# Patient Record
Sex: MALE | Race: OTHER | NOT HISPANIC OR LATINO | URBAN - METROPOLITAN AREA
[De-identification: names, ages, dates, MRNs, and addresses within clinical notes are randomized per-mention and may not be internally consistent; named-entity substitution may affect disease eponyms.]

---

## 2021-04-02 ENCOUNTER — OFFICE (OUTPATIENT)
Dept: URBAN - METROPOLITAN AREA CLINIC 34 | Facility: CLINIC | Age: 56
End: 2021-04-02

## 2021-04-02 VITALS
HEIGHT: 71 IN | DIASTOLIC BLOOD PRESSURE: 93 MMHG | HEART RATE: 70 BPM | TEMPERATURE: 96.8 F | WEIGHT: 177.8 LBS | SYSTOLIC BLOOD PRESSURE: 135 MMHG

## 2021-04-02 DIAGNOSIS — K56.609 UNSPECIFIED INTESTINAL OBSTRUCTION, UNSPECIFIED AS TO PARTIA: ICD-10-CM

## 2021-04-02 PROCEDURE — 99243 OFF/OP CNSLTJ NEW/EST LOW 30: CPT | Performed by: PHYSICIAN ASSISTANT

## 2021-04-02 NOTE — SERVICEHPINOTES
DAVE MANRIQUEZ   is a   55   year old male who is being seen in consultation at the request of   CHANTAL BURNHAM   for recent small bowel obstruction. He has h/o initial SBO for which he had surgery and bowel resection in 2006 and was told he had scar tissue at that time, despite no prior surgeries. He reports an SBO in 2017 and again had recent ER visit for diffuse abdominal pain and CT showing SBO in mid-distal region. Symptoms resolved with conservative care. He notes remote h/o tapeworm or some type of parasite when he was young. He is from [place]. He normally has 2-3 BMs per day, no blood and no diarrhea. He had a colonoscopy circa 2008 and is aware he is due for a screening procedure- wants to wait until this summer. He is feeling well today. He takes Enbrel for ankylosing spondylitis.

## 2022-12-22 PROBLEM — Z12.11 SCREENING COLON: Status: ACTIVE | Noted: 2022-12-22

## 2023-01-16 ENCOUNTER — OFFICE (OUTPATIENT)
Dept: URBAN - METROPOLITAN AREA CLINIC 34 | Facility: CLINIC | Age: 58
End: 2023-01-16

## 2023-01-16 PROCEDURE — 00031: CPT | Performed by: INTERNAL MEDICINE

## 2023-05-23 ENCOUNTER — OFFICE (OUTPATIENT)
Dept: URBAN - METROPOLITAN AREA CLINIC 30 | Facility: CLINIC | Age: 58
End: 2023-05-23
Payer: COMMERCIAL

## 2023-05-23 VITALS
SYSTOLIC BLOOD PRESSURE: 125 MMHG | RESPIRATION RATE: 15 BRPM | RESPIRATION RATE: 13 BRPM | SYSTOLIC BLOOD PRESSURE: 116 MMHG | TEMPERATURE: 98 F | SYSTOLIC BLOOD PRESSURE: 158 MMHG | DIASTOLIC BLOOD PRESSURE: 82 MMHG | HEIGHT: 71 IN | SYSTOLIC BLOOD PRESSURE: 144 MMHG | OXYGEN SATURATION: 99 % | DIASTOLIC BLOOD PRESSURE: 86 MMHG | OXYGEN SATURATION: 98 % | HEART RATE: 72 BPM | HEART RATE: 75 BPM | DIASTOLIC BLOOD PRESSURE: 61 MMHG | HEART RATE: 71 BPM | OXYGEN SATURATION: 100 % | OXYGEN SATURATION: 74 % | RESPIRATION RATE: 11 BRPM | DIASTOLIC BLOOD PRESSURE: 67 MMHG | RESPIRATION RATE: 12 BRPM | SYSTOLIC BLOOD PRESSURE: 150 MMHG | HEART RATE: 68 BPM | DIASTOLIC BLOOD PRESSURE: 72 MMHG | TEMPERATURE: 98.6 F | SYSTOLIC BLOOD PRESSURE: 142 MMHG | WEIGHT: 175 LBS | HEART RATE: 67 BPM | RESPIRATION RATE: 16 BRPM

## 2023-05-23 DIAGNOSIS — Z12.11 ENCOUNTER FOR SCREENING FOR MALIGNANT NEOPLASM OF COLON: ICD-10-CM
